# Patient Record
Sex: FEMALE | Race: WHITE | NOT HISPANIC OR LATINO | URBAN - METROPOLITAN AREA
[De-identification: names, ages, dates, MRNs, and addresses within clinical notes are randomized per-mention and may not be internally consistent; named-entity substitution may affect disease eponyms.]

---

## 2024-09-13 ENCOUNTER — EMERGENCY (EMERGENCY)
Facility: HOSPITAL | Age: 59
LOS: 1 days | Discharge: HOME | End: 2024-09-13
Attending: EMERGENCY MEDICINE
Payer: MEDICAID

## 2024-09-13 VITALS
DIASTOLIC BLOOD PRESSURE: 62 MMHG | SYSTOLIC BLOOD PRESSURE: 106 MMHG | HEART RATE: 76 BPM | OXYGEN SATURATION: 96 % | RESPIRATION RATE: 17 BRPM | TEMPERATURE: 98 F

## 2024-09-13 VITALS
SYSTOLIC BLOOD PRESSURE: 164 MMHG | WEIGHT: 160.28 LBS | RESPIRATION RATE: 18 BRPM | TEMPERATURE: 98 F | HEART RATE: 127 BPM | OXYGEN SATURATION: 94 % | DIASTOLIC BLOOD PRESSURE: 111 MMHG

## 2024-09-13 LAB
ALBUMIN SERPL ELPH-MCNC: 4.5 G/DL — SIGNIFICANT CHANGE UP (ref 3.3–5.2)
ALP SERPL-CCNC: 132 U/L — HIGH (ref 40–120)
ALT FLD-CCNC: 77 U/L — HIGH
ANION GAP SERPL CALC-SCNC: 15 MMOL/L — SIGNIFICANT CHANGE UP (ref 5–17)
APPEARANCE UR: CLEAR — SIGNIFICANT CHANGE UP
AST SERPL-CCNC: 52 U/L — HIGH
BASOPHILS # BLD AUTO: 0.06 K/UL — SIGNIFICANT CHANGE UP (ref 0–0.2)
BASOPHILS NFR BLD AUTO: 0.6 % — SIGNIFICANT CHANGE UP (ref 0–2)
BILIRUB SERPL-MCNC: 0.4 MG/DL — SIGNIFICANT CHANGE UP (ref 0.4–2)
BILIRUB UR-MCNC: NEGATIVE — SIGNIFICANT CHANGE UP
BUN SERPL-MCNC: 11.7 MG/DL — SIGNIFICANT CHANGE UP (ref 8–20)
CALCIUM SERPL-MCNC: 9.4 MG/DL — SIGNIFICANT CHANGE UP (ref 8.4–10.5)
CHLORIDE SERPL-SCNC: 96 MMOL/L — SIGNIFICANT CHANGE UP (ref 96–108)
CO2 SERPL-SCNC: 24 MMOL/L — SIGNIFICANT CHANGE UP (ref 22–29)
COLOR SPEC: YELLOW — SIGNIFICANT CHANGE UP
CREAT SERPL-MCNC: 0.54 MG/DL — SIGNIFICANT CHANGE UP (ref 0.5–1.3)
DIFF PNL FLD: NEGATIVE — SIGNIFICANT CHANGE UP
EGFR: 106 ML/MIN/1.73M2 — SIGNIFICANT CHANGE UP
EOSINOPHIL # BLD AUTO: 0.03 K/UL — SIGNIFICANT CHANGE UP (ref 0–0.5)
EOSINOPHIL NFR BLD AUTO: 0.3 % — SIGNIFICANT CHANGE UP (ref 0–6)
GLUCOSE SERPL-MCNC: 119 MG/DL — HIGH (ref 70–99)
GLUCOSE UR QL: NEGATIVE MG/DL — SIGNIFICANT CHANGE UP
HCT VFR BLD CALC: 44.1 % — SIGNIFICANT CHANGE UP (ref 34.5–45)
HGB BLD-MCNC: 15 G/DL — SIGNIFICANT CHANGE UP (ref 11.5–15.5)
IMM GRANULOCYTES NFR BLD AUTO: 0.5 % — SIGNIFICANT CHANGE UP (ref 0–0.9)
KETONES UR-MCNC: NEGATIVE MG/DL — SIGNIFICANT CHANGE UP
LEUKOCYTE ESTERASE UR-ACNC: NEGATIVE — SIGNIFICANT CHANGE UP
LIDOCAIN IGE QN: 27 U/L — SIGNIFICANT CHANGE UP (ref 22–51)
LYMPHOCYTES # BLD AUTO: 1.41 K/UL — SIGNIFICANT CHANGE UP (ref 1–3.3)
LYMPHOCYTES # BLD AUTO: 13.9 % — SIGNIFICANT CHANGE UP (ref 13–44)
MAGNESIUM SERPL-MCNC: 2 MG/DL — SIGNIFICANT CHANGE UP (ref 1.6–2.6)
MCHC RBC-ENTMCNC: 31.1 PG — SIGNIFICANT CHANGE UP (ref 27–34)
MCHC RBC-ENTMCNC: 34 GM/DL — SIGNIFICANT CHANGE UP (ref 32–36)
MCV RBC AUTO: 91.5 FL — SIGNIFICANT CHANGE UP (ref 80–100)
MONOCYTES # BLD AUTO: 0.7 K/UL — SIGNIFICANT CHANGE UP (ref 0–0.9)
MONOCYTES NFR BLD AUTO: 6.9 % — SIGNIFICANT CHANGE UP (ref 2–14)
NEUTROPHILS # BLD AUTO: 7.93 K/UL — HIGH (ref 1.8–7.4)
NEUTROPHILS NFR BLD AUTO: 77.8 % — HIGH (ref 43–77)
NITRITE UR-MCNC: NEGATIVE — SIGNIFICANT CHANGE UP
PH UR: 8 — SIGNIFICANT CHANGE UP (ref 5–8)
PLATELET # BLD AUTO: 252 K/UL — SIGNIFICANT CHANGE UP (ref 150–400)
POTASSIUM SERPL-MCNC: 3.9 MMOL/L — SIGNIFICANT CHANGE UP (ref 3.5–5.3)
POTASSIUM SERPL-SCNC: 3.9 MMOL/L — SIGNIFICANT CHANGE UP (ref 3.5–5.3)
PROT SERPL-MCNC: 7.3 G/DL — SIGNIFICANT CHANGE UP (ref 6.6–8.7)
PROT UR-MCNC: NEGATIVE MG/DL — SIGNIFICANT CHANGE UP
RBC # BLD: 4.82 M/UL — SIGNIFICANT CHANGE UP (ref 3.8–5.2)
RBC # FLD: 12.3 % — SIGNIFICANT CHANGE UP (ref 10.3–14.5)
SODIUM SERPL-SCNC: 135 MMOL/L — SIGNIFICANT CHANGE UP (ref 135–145)
SP GR SPEC: 1.01 — SIGNIFICANT CHANGE UP (ref 1–1.03)
TROPONIN T, HIGH SENSITIVITY RESULT: 6 NG/L — SIGNIFICANT CHANGE UP (ref 0–51)
TROPONIN T, HIGH SENSITIVITY RESULT: 7 NG/L — SIGNIFICANT CHANGE UP (ref 0–51)
UROBILINOGEN FLD QL: 0.2 MG/DL — SIGNIFICANT CHANGE UP (ref 0.2–1)
WBC # BLD: 10.18 K/UL — SIGNIFICANT CHANGE UP (ref 3.8–10.5)
WBC # FLD AUTO: 10.18 K/UL — SIGNIFICANT CHANGE UP (ref 3.8–10.5)

## 2024-09-13 PROCEDURE — 81003 URINALYSIS AUTO W/O SCOPE: CPT

## 2024-09-13 PROCEDURE — T1013: CPT

## 2024-09-13 PROCEDURE — 80053 COMPREHEN METABOLIC PANEL: CPT

## 2024-09-13 PROCEDURE — 93010 ELECTROCARDIOGRAM REPORT: CPT

## 2024-09-13 PROCEDURE — 83690 ASSAY OF LIPASE: CPT

## 2024-09-13 PROCEDURE — 83735 ASSAY OF MAGNESIUM: CPT

## 2024-09-13 PROCEDURE — 96365 THER/PROPH/DIAG IV INF INIT: CPT

## 2024-09-13 PROCEDURE — 87086 URINE CULTURE/COLONY COUNT: CPT

## 2024-09-13 PROCEDURE — 36415 COLL VENOUS BLD VENIPUNCTURE: CPT

## 2024-09-13 PROCEDURE — 85025 COMPLETE CBC W/AUTO DIFF WBC: CPT

## 2024-09-13 PROCEDURE — 99285 EMERGENCY DEPT VISIT HI MDM: CPT | Mod: 25

## 2024-09-13 PROCEDURE — 71045 X-RAY EXAM CHEST 1 VIEW: CPT

## 2024-09-13 PROCEDURE — 71045 X-RAY EXAM CHEST 1 VIEW: CPT | Mod: 26

## 2024-09-13 PROCEDURE — 93005 ELECTROCARDIOGRAM TRACING: CPT

## 2024-09-13 PROCEDURE — 99285 EMERGENCY DEPT VISIT HI MDM: CPT

## 2024-09-13 PROCEDURE — 84484 ASSAY OF TROPONIN QUANT: CPT

## 2024-09-13 RX ORDER — ACETAMINOPHEN 325 MG/1
1000 TABLET ORAL ONCE
Refills: 0 | Status: COMPLETED | OUTPATIENT
Start: 2024-09-13 | End: 2024-09-13

## 2024-09-13 RX ORDER — SODIUM CHLORIDE 9 MG/ML
1000 INJECTION INTRAMUSCULAR; INTRAVENOUS; SUBCUTANEOUS ONCE
Refills: 0 | Status: COMPLETED | OUTPATIENT
Start: 2024-09-13 | End: 2024-09-13

## 2024-09-13 RX ADMIN — SODIUM CHLORIDE 1000 MILLILITER(S): 9 INJECTION INTRAMUSCULAR; INTRAVENOUS; SUBCUTANEOUS at 18:23

## 2024-09-13 RX ADMIN — ACETAMINOPHEN 400 MILLIGRAM(S): 325 TABLET ORAL at 18:23

## 2024-09-13 RX ADMIN — ACETAMINOPHEN 1000 MILLIGRAM(S): 325 TABLET ORAL at 19:09

## 2024-09-13 RX ADMIN — ACETAMINOPHEN 1000 MILLIGRAM(S): 325 TABLET ORAL at 19:08

## 2024-09-13 NOTE — ED PROVIDER NOTE - CPE EDP NEURO NORM
Orthopedic/Sports Medicine Fracture Triage    Incoming call/or message from call center member.    Fracture type: Elbow.    The patient is in a   sling .    Date of injury 8/13/23.    Triaged by: KELLEN Bustillo .    Determined to be managed Non operatively.    Needs to be seen in 1-2 weeks.    Additional Comments/information: DIVYA Wade, ATC          normal...

## 2024-09-13 NOTE — ED PROVIDER NOTE - ATTENDING CONTRIBUTION TO CARE
58 -year-old female history of hypertension presents with near syncopal episode today while she was in a car.  Patient report feeling lightheaded associate with palpitation and fatigue with blurry vision.  Symptoms now resolved.  Patient has intermittent pain behind her right eye with occasional tingling which is now resolved.  No chest pain.  EKG without ischemic changes. Lab values do not require emergent intervention. trop 6, repeat 7. Urinalysis demonstrates no acute pathology.  Urine culture pending. As interpreted by undersigned physician, chest xray demonstrates no acute pathology.  patient had a transient episode of near syncope which is now resolved.  Low risk for ACS.  Report feeling well.  Comfortable going home with cardiology follow-up.

## 2024-09-13 NOTE — ED PROVIDER NOTE - CLINICAL SUMMARY MEDICAL DECISION MAKING FREE TEXT BOX
57 y/o F no PMHx presents with dizziness since this afternoon. Patient had HA this morning, felt dizzy and had chest palpitations while she was in the car driving back from lunch with her coworker. No LOC, falls, head strike. VS WNL, NSR during interview and evaluation, physical exam WNL, patient was able to ambulate independently but said she felt a little dizzy.     Differential diagnosis includes but not limited to peripheral/central vertigo vs ACS vs hypo/hyperglycemia    Plan:

## 2024-09-13 NOTE — ED PROVIDER NOTE - PATIENT PORTAL LINK FT
You can access the FollowMyHealth Patient Portal offered by [facility] by registering at the following website: [email or web address]. By joining [facility]’s FollowMyHealth portal, you will also be able to view your health information using other applications (apps) compatible with our system.

## 2024-09-13 NOTE — ED ADULT NURSE NOTE - NSFALLLASTSIX_ED_ALL_ED
Length Of Therapy: 8 months Latest Cmp (Optional): September 2019      WNL Latest Hbv (Optional): September 2019    Non-reactive Add High Risk Medication Management Associated Diagnosis?: Yes Date Of Last Negative Ppd (Optional): September 2019        Negative Detail Level: Zone Latest Cbc (Optional): September 2019    WNL Latest Hcv (Optional): September 2019    0.14    Negative No.

## 2024-09-13 NOTE — ED PROVIDER NOTE - OBJECTIVE STATEMENT
59 y/o F no PMHx presents with dizziness since this afternoon. Patient is accompanied by coworker who has been with her during the day. She says she woke up this morning with headache and went to work, After lunch around 1:30PM when she and her coworker were driving back, patient was passenger, she experienced dizziness w/o LOC. She endorses chest palpitations and numbness and tingling of the feet at the time. She says she still feels a little dizzy but it has improved from earlier after eating a banana and drinking water while on the way to the ED. She endorses burning abdominal pain. Denies nausea, vomiting, chest pain, cough, difficulty breathing.

## 2024-09-13 NOTE — ED ADULT NURSE REASSESSMENT NOTE - NS ED NURSE REASSESS COMMENT FT1
Assumed care of patient from ABDOULAYE Jordan. Pt awake & alert, oriented x 4. Pt is resting comfortably in bed. Offering no complaints at this time. Airway is patent. Respirations even and unlabored. NSR on the monitor. Plan of care ongoing. Pt safety maintained.

## 2024-09-13 NOTE — ED PROVIDER NOTE - NSFOLLOWUPINSTRUCTIONS_ED_ALL_ED_FT
1) Kiarra un seguimiento con hall médico de atención primaria en los próximos 5 a 7 días.  Por favor llame mañana para wanda kevan.  Si no puede realizar un seguimiento con hall médico de atención primaria, regrese al servicio de urgencias si tiene algún problema urgente.  2) Se le entregó wanda copia de las pruebas realizadas hoy.  Traiga los resultados con usted y revíselos con hall médico de atención primaria.  3) Si cody síntomas empeoran o tiene alguna otra inquietud, regrese al servicio de urgencias de inmediato.  4) Continúe tomando cody medicamentos caseros según las indicaciones.  Presíncope  Near-Syncope  Outline of the head showing blood vessels that supply the brain.  El presíncope ocurre cuando, súbitamente, usted se siente sharri si se fuera a desmayar, korey en realidad no pierde la conciencia. A cody también se lo puede llamar amenaza de síncope. Ruddy un episodio de presíncope, usted puede tener los siguientes síntomas:  Sentirse mareado, débil, aturdido o sharri si la habitación estuviera girando.  Sentir náuseas.  Giorgi manchas o giorgi todo garcia o todo ebonie en el [place] de visión.  Tener la piel fría y húmeda o sentir calor y sudor.  Oír zumbidos en los oídos (tinnitus).  La causa de esta afección es wanda disminución súbita del flujo de andrea al cerebro. Esta disminución puede ocurrir por varios motivos, korey la mayoría de las veces no es peligroso. Sin embargo, el presíncope puede ser wanda señal de un problema médico grave, por eso es importante buscar atención médica.    Siga estas indicaciones en hall casa:  Medicamentos    Use los medicamentos de venta pascual y los recetados solamente sharri se lo haya indicado el médico.  Si está tomando medicamentos para la presión arterial o para el corazón, póngase de pie lentamente, tómese algunos minutos para permanecer sentado y luego párese. [place] puede reducir los mareos y el riesgo de presíncope.  Estilo de kari    No conduzca vehículos, no use maquinarias ni practique deportes hasta que el médico lo autorice.  No shaun alcohol.  No consuma ningún producto que contenga nicotina o tabaco. Estos productos incluyen cigarrillos, tabaco para mascar y aparatos de vapeo, sharri los cigarrillos electrónicos. Si necesita ayuda para dejar de consumir estos productos, consulte al médico.  Evite saunas y bañeras de hidromasajes.  Indicaciones generales    Esté atento a cualquier cambio en los síntomas.  [place] con el médico acerca de cody síntomas. Es posible que deba realizarse estudios para entender la causa del presíncope.  Si comienza a sentir que va a desmayarse, siéntese o recuéstese inmediatamente. Si se sienta, coloque la savanna entre las piernas. Si se recuesta, levante (eleve) los pies por encima del nivel del corazón.  Respire profundamente y de manera continua. Espere hasta que los síntomas hayan desaparecido.  Pídale a alguien que se quede con usted hasta que se sienta estable.  Shaun suficiente líquido sharri para mantener la orina de color amarillo pálido.  Evite permanecer de pie mucho tiempo. Si debe permanecer de pie ruddy mucho tiempo, realice movimientos sharri los siguientes:   las piernas.  Cruzar las piernas.  Flexionar y estirar los músculos de las piernas.  Ponerse en cuclillas.  Concurra a todas las visitas de seguimiento. [place] es importante.  Comuníquese con un médico si:  Sigue teniendo episodios sharri si fuera a desmayarse.  Solicite ayuda de inmediato si:  Se desmaya.  Tiene cualquiera de estos síntomas que pueden indicar problemas en el corazón:  Latidos cardíacos acelerados o irregulares (palpitaciones).  Dolor fuera de lo normal en el pecho, el abdomen o la espalda.  Falta de aire.  Tiene wanda convulsión.  Siente un dolor de savanna intenso.  Se siente confundido.  Tiene problemas de visión.  Tiene debilidad intensa o dificultad para caminar.  Sangra por la boca o el recto, o cody heces son de color ebonie o de aspecto alquitranado.  Estos síntomas pueden representar un problema grave que constituye wanda emergencia. No espere hasta que los síntomas desaparezcan. Solicite atención médica de inmediato. Comuníquese con el servicio de emergencias de hall localidad (911 en los [place]). No conduzca por cody propios medios hasta el hospital.    Resumen  El presíncope ocurre cuando, súbitamente, usted se siente sharri si se fuera a desmayar, korey en realidad no pierde la conciencia.  La causa de esta afección es wanda disminución súbita del flujo de andrea al cerebro. Esta disminución puede ocurrir por varios motivos, korey la mayoría de las veces no es peligroso.  El presíncope puede ser wanda señal de un problema médico grave, por eso es importante buscar atención médica.  Si comienza a sentir que va a desmayarse, siéntese o recuéstese inmediatamente. Si se sienta, coloque la savanna entre las piernas. Si se recuesta, levante (eleve) los pies por encima del nivel del corazón.  [place] con el médico acerca de cody síntomas. Es posible que deba realizarse estudios para entender la causa del presíncope.  Esta información no tiene sharri fin reemplazar el consejo del médico. Asegúrese de hacerle al médico cualquier pregunta que tenga.

## 2024-09-13 NOTE — ED ADULT NURSE NOTE - OBJECTIVE STATEMENT
Patient A&O x 4, respiration even and unlabored, reports to ED complaining of left side headache, dizziness and chest pressure which started this AM. No prior cardiac history reported.  Denies any Nausea or vomiting.  No cough  or respiratory distress noted. Safety maintained

## 2024-09-13 NOTE — ED ADULT TRIAGE NOTE - NS ED NURSE BANDS TYPE
Radiology Discharge Summary      Hospital Course: No complications    Admit Date: 11/15/2017  Discharge Date: 11/15/2017     Instructions Given to Patient: Yes  Diet: Resume prior diet  Activity: activity as tolerated    Description of Condition on Discharge: Stable  Vital Signs (Most Recent): Temp: 97.5 °F (36.4 °C) (11/15/17 1130)  Pulse: (!) 48 (11/15/17 1300)  Resp: 16 (11/15/17 1300)  BP: 111/65 (11/15/17 1300)  SpO2: 99 % (11/15/17 1300)    Discharge Disposition: Home    Discharge Diagnosis: status post cerebral angiogram.  Patent right ICA stent.       Follow-up: with Dr. Gary Brady MD  Neurointerventional Fellow  Department of Radiology  [phone]    
Name band;

## 2024-09-15 LAB
CULTURE RESULTS: SIGNIFICANT CHANGE UP
SPECIMEN SOURCE: SIGNIFICANT CHANGE UP